# Patient Record
Sex: MALE | Race: WHITE | NOT HISPANIC OR LATINO | Employment: FULL TIME | URBAN - METROPOLITAN AREA
[De-identification: names, ages, dates, MRNs, and addresses within clinical notes are randomized per-mention and may not be internally consistent; named-entity substitution may affect disease eponyms.]

---

## 2024-06-13 ENCOUNTER — HOSPITAL ENCOUNTER (OUTPATIENT)
Dept: RADIOLOGY | Facility: CLINIC | Age: 24
Discharge: HOME | End: 2024-06-13
Payer: COMMERCIAL

## 2024-06-13 ENCOUNTER — OFFICE VISIT (OUTPATIENT)
Dept: ORTHOPEDIC SURGERY | Facility: CLINIC | Age: 24
End: 2024-06-13
Payer: COMMERCIAL

## 2024-06-13 VITALS — WEIGHT: 315 LBS | HEIGHT: 72 IN | BODY MASS INDEX: 42.66 KG/M2

## 2024-06-13 DIAGNOSIS — M25.571 ACUTE RIGHT ANKLE PAIN: ICD-10-CM

## 2024-06-13 DIAGNOSIS — S93.409A GRADE 1 ANKLE SPRAIN: Primary | ICD-10-CM

## 2024-06-13 PROCEDURE — L1902 AFO ANKLE GAUNTLET PRE OTS: HCPCS | Performed by: STUDENT IN AN ORGANIZED HEALTH CARE EDUCATION/TRAINING PROGRAM

## 2024-06-13 PROCEDURE — 73610 X-RAY EXAM OF ANKLE: CPT | Mod: RIGHT SIDE | Performed by: STUDENT IN AN ORGANIZED HEALTH CARE EDUCATION/TRAINING PROGRAM

## 2024-06-13 PROCEDURE — 73610 X-RAY EXAM OF ANKLE: CPT | Mod: RT

## 2024-06-13 PROCEDURE — 99214 OFFICE O/P EST MOD 30 MIN: CPT | Performed by: STUDENT IN AN ORGANIZED HEALTH CARE EDUCATION/TRAINING PROGRAM

## 2024-06-13 PROCEDURE — 99204 OFFICE O/P NEW MOD 45 MIN: CPT | Performed by: STUDENT IN AN ORGANIZED HEALTH CARE EDUCATION/TRAINING PROGRAM

## 2024-06-13 RX ORDER — NAPROXEN 500 MG/1
500 TABLET ORAL
Qty: 28 TABLET | Refills: 0 | Status: SHIPPED | OUTPATIENT
Start: 2024-06-13 | End: 2024-06-27

## 2024-06-13 ASSESSMENT — PATIENT HEALTH QUESTIONNAIRE - PHQ9
1. LITTLE INTEREST OR PLEASURE IN DOING THINGS: NOT AT ALL
SUM OF ALL RESPONSES TO PHQ9 QUESTIONS 1 AND 2: 0
2. FEELING DOWN, DEPRESSED OR HOPELESS: NOT AT ALL

## 2024-06-13 NOTE — LETTER
June 13, 2024     Patient: Adolfo Zavala   YOB: 2000   Date of Visit: 6/13/2024       To Whom It May Concern:    It is my medical opinion that Adolfo Zavala may return to work on 06/17/24 , or sooner if patient is feeling better.    If you have any questions or concerns, please don't hesitate to call.         Sincerely,        Dereje Coello,     CC: No Recipients

## 2024-06-13 NOTE — PROGRESS NOTES
Acute Injury New Patient Visit    HPI: Adolfo is a 24 y.o.male who presents today with new complaints of right ankle injury.  He had an inversion type injury yesterday on 6/12/2024.  He notes bruising on the lateral aspect of the ankle.  He has been able to walk, but this has been increasingly painful.  He also has some pain towards the heel.  He has been trying ice and ibuprofen.  He denies any pain about the knee.  He denies any numbness tingling.    Plan: For this grade 1 lateral ankle sprain, we will provide him with a lace up ankle brace, settle down the inflammation with naproxen, and give him a work note that he go back on Monday or sooner if he is feeling better.  We will follow-up in 2 to 3 weeks.  He can continue to progress out of the ankle brace as needed.  He should continue to work on range of motion exercises.  No repeat x-rays necessary on follow-up.    Assessment:   Problem List Items Addressed This Visit    None  Visit Diagnoses       Grade 1 ankle sprain    -  Primary    Relevant Medications    naproxen (Naprosyn) 500 mg tablet    Other Relevant Orders    Ankle Brace, Lace Up or A60    Acute right ankle pain        Relevant Medications    naproxen (Naprosyn) 500 mg tablet    Other Relevant Orders    XR ankle right 3+ views    Ankle Brace, Lace Up or A60            Diagnostics: 3 view x-rays of the right ankle reveal no acute fracture or dislocation.      Procedure:  Procedures    Physical Exam:  GENERAL:  No obvious acute distress.  NEURO:  Distally neurovascularly intact.  Sensation intact to light touch.  Extremity: Right ankle exam shows:  Skin is intact;  No erythema or warmth;  Mild to moderate amount of soft tissue swelling laterally with no bruising;  No clinical signs of infection;  No pain over the lateral malleolus;  No pain over the medial malleolus;  TENDER over the ATFL, but not the CF or PTF ligaments;  No pain over the deltoid ligament;  No pain over the Achilles  tendon;  Negative Haley's test;  Negative squeeze test;  Negative anterior drawer test;  Negative talar tilt test;  No pain over the anterior process of the talus;  No pain over the talar dome;  No pain over the base of the fifth metatarsal bone;  No pain over the calcaneus;  No pain over the plantar aponeurosis;  No pain of the midfoot; and  Neurovascularly intact.      Orders Placed This Encounter    Ankle Brace, Lace Up or A60    XR ankle right 3+ views    naproxen (Naprosyn) 500 mg tablet      At the conclusion of the visit there were no further questions by the patient/family regarding their plan of care.  Patient was instructed to call or return with any issues, questions, or concerns regarding their injury and/or treatment plan described above.     06/13/24 at 12:11 PM - Dereje Coello DO    Office: [phone]    This note was prepared using voice recognition software.  The details of this note are correct and have been reviewed, and corrected to the best of my ability.  Some grammatical errors may persist related to the Dragon software.

## 2024-07-03 ENCOUNTER — APPOINTMENT (OUTPATIENT)
Dept: ORTHOPEDIC SURGERY | Facility: CLINIC | Age: 24
End: 2024-07-03
Payer: COMMERCIAL